# Patient Record
Sex: MALE | Race: WHITE | NOT HISPANIC OR LATINO | URBAN - METROPOLITAN AREA
[De-identification: names, ages, dates, MRNs, and addresses within clinical notes are randomized per-mention and may not be internally consistent; named-entity substitution may affect disease eponyms.]

---

## 2019-05-13 ENCOUNTER — APPOINTMENT (RX ONLY)
Dept: URBAN - METROPOLITAN AREA CLINIC 110 | Facility: CLINIC | Age: 84
Setting detail: DERMATOLOGY
End: 2019-05-13

## 2019-05-13 DIAGNOSIS — Z85.820 PERSONAL HISTORY OF MALIGNANT MELANOMA OF SKIN: ICD-10-CM

## 2019-05-13 PROBLEM — L85.3 XEROSIS CUTIS: Status: ACTIVE | Noted: 2019-05-13

## 2019-05-13 PROBLEM — D04.4 CARCINOMA IN SITU OF SKIN OF SCALP AND NECK: Status: ACTIVE | Noted: 2019-05-13

## 2019-05-13 PROBLEM — R23.3 SPONTANEOUS ECCHYMOSES: Status: ACTIVE | Noted: 2019-05-13

## 2019-05-13 PROCEDURE — ? COUNSELING

## 2019-05-13 PROCEDURE — ? ADDITIONAL NOTES

## 2019-05-13 PROCEDURE — ? DEFER

## 2019-05-13 PROCEDURE — 99202 OFFICE O/P NEW SF 15 MIN: CPT

## 2019-05-13 PROCEDURE — ? DIAGNOSIS COMMENT

## 2019-05-13 ASSESSMENT — LOCATION ZONE DERM
LOCATION ZONE: FACE
LOCATION ZONE: FACE

## 2019-05-13 ASSESSMENT — LOCATION DETAILED DESCRIPTION DERM
LOCATION DETAILED: SUPERIOR MID FOREHEAD
LOCATION DETAILED: SUPERIOR MID FOREHEAD

## 2019-05-13 ASSESSMENT — LOCATION SIMPLE DESCRIPTION DERM
LOCATION SIMPLE: SUPERIOR FOREHEAD
LOCATION SIMPLE: SUPERIOR FOREHEAD

## 2019-05-13 NOTE — PROCEDURE: DIAGNOSIS COMMENT
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
Detail Level: Simple

## 2019-05-13 NOTE — PROCEDURE: DEFER
Procedure To Be Performed At Next Visit: Mohs surgery
Introduction Text (Please End With A Colon): The following procedure was deferred:
Detail Level: Detailed

## 2019-05-13 NOTE — HPI: SKIN LESION (SQUAMOUS CELL CARCINOMA)
How Severe Is Your Skin Cancer?: moderate
Is This A New Presentation, Or A Follow-Up?: Squamous Cell Carcinoma
When Was Squamous Cell Carcinoma Biopsied? (Optional): 3/13/19
Accession # (Optional): [id number]
Location From Outside Provider (Will Override Previously Chosen Location): Left Frontal Scalp

## 2019-05-13 NOTE — PROCEDURE: ADDITIONAL NOTES
Additional Notes: Patient has primary dermatologist to complete appropriate skin cancer screening exams, Dr Rosanne Whalen. Patient presents today for Mohs surgery referral for Dr Royal.
Detail Level: Simple

## 2019-05-16 ENCOUNTER — APPOINTMENT (RX ONLY)
Dept: URBAN - METROPOLITAN AREA CLINIC 110 | Facility: CLINIC | Age: 84
Setting detail: DERMATOLOGY
End: 2019-05-16

## 2019-05-16 PROBLEM — D04.4 CARCINOMA IN SITU OF SKIN OF SCALP AND NECK: Status: ACTIVE | Noted: 2019-05-16

## 2019-05-16 PROCEDURE — ? ADDITIONAL NOTES

## 2019-05-16 PROCEDURE — ? CURETTAGE AND DESTRUCTION

## 2019-05-16 PROCEDURE — 17272 DSTR MAL LES S/N/H/F/G 1.1-2: CPT

## 2019-05-16 NOTE — PROCEDURE: CURETTAGE AND DESTRUCTION
Consent was obtained from the patient. The risks, benefits and alternatives to therapy were discussed in detail. Specifically, the risks of infection, scarring, bleeding, prolonged wound healing, nerve injury, incomplete removal, allergy to anesthesia and recurrence were addressed. Alternatives to ED&C, such as: surgical removal and XRT were also discussed.  Prior to the procedure, the treatment site was clearly identified and confirmed by the patient. All components of Universal Protocol/PAUSE Rule completed.
Number Of Curettages: 3
Additional Information: (Optional): The wound was cleaned, and a pressure dressing was applied.  The patient received detailed post-op instructions.
Hide Accession Number?: No
Cautery Type: electrodesiccation
Post-Care Instructions: I reviewed with the patient in detail post-care instructions. Patient is to keep the area dry for 48 hours, and not to engage in any swimming until the area is healed. Should the patient develop any fevers, chills, bleeding, severe pain patient will contact the office immediately.
Body Location Override (Optional - Billing Will Still Be Based On Selected Body Map Location If Applicable): left frontal scalp
Size Of Lesion After Curettage: 1.9
What Was Performed First?: Curettage
Bill As A Line Item Or As Units: Line Item
Biopsy Photograph Reviewed: Yes
Total Volume (Ccs): 1
Anesthesia Volume In Cc: 2
Size Of Lesion In Cm: 1.5
Detail Level: Detailed
Anesthesia Type: 1% lidocaine with epinephrine

## 2019-05-16 NOTE — PROCEDURE: ADDITIONAL NOTES
Additional Notes: Recommended Mohs for this SCC in situ with adnexal extension. Patient was originally biopsied by Dr Whalen, their dermatologist here in [place]. Patient was referred to Dr Royal for Mohs to be completed in [facility]. Dr Royal said closure by an outside surgeon would be needed for this location. Patient and daughter elected for ED&C instead of Mohs. Discussed with them that their dermatologist, Dr Whalen, would also be capable of performing an ED&C. Patient and daughter elected to complete an ED&C today. Additional Notes: Recommended Mohs for this SCC in situ with adnexal extension. Patient was originally biopsied by Dr Whalen, their dermatologist here in [place]. Patient was referred to Dr Royal for Mohs to be completed in [facility]. Dr Royal said closure by an outside surgeon would be needed for this location. Patient and daughter elected for ED&C instead of Mohs. Discussed with them that their dermatologist, Dr Whalen, would also be capable of performing an ED&C. Patient and daughter elected to complete an ED&C today.